# Patient Record
Sex: MALE | Race: OTHER | HISPANIC OR LATINO | URBAN - METROPOLITAN AREA
[De-identification: names, ages, dates, MRNs, and addresses within clinical notes are randomized per-mention and may not be internally consistent; named-entity substitution may affect disease eponyms.]

---

## 2017-01-06 ENCOUNTER — OUTPATIENT (OUTPATIENT)
Dept: OUTPATIENT SERVICES | Facility: HOSPITAL | Age: 32
LOS: 1 days | Discharge: HOME | End: 2017-01-06

## 2017-06-27 DIAGNOSIS — K02.53 DENTAL CARIES ON PIT AND FISSURE SURFACE PENETRATING INTO PULP: ICD-10-CM

## 2018-03-07 ENCOUNTER — OUTPATIENT (OUTPATIENT)
Dept: OUTPATIENT SERVICES | Facility: HOSPITAL | Age: 33
LOS: 1 days | Discharge: HOME | End: 2018-03-07

## 2018-03-12 DIAGNOSIS — K02.63 DENTAL CARIES ON SMOOTH SURFACE PENETRATING INTO PULP: ICD-10-CM

## 2019-10-04 ENCOUNTER — EMERGENCY (EMERGENCY)
Facility: HOSPITAL | Age: 34
LOS: 0 days | Discharge: HOME | End: 2019-10-04
Admitting: EMERGENCY MEDICINE
Payer: MEDICAID

## 2019-10-04 VITALS — WEIGHT: 175.05 LBS

## 2019-10-04 VITALS
DIASTOLIC BLOOD PRESSURE: 79 MMHG | RESPIRATION RATE: 18 BRPM | SYSTOLIC BLOOD PRESSURE: 137 MMHG | HEART RATE: 59 BPM | TEMPERATURE: 97 F | OXYGEN SATURATION: 99 %

## 2019-10-04 DIAGNOSIS — M79.676 PAIN IN UNSPECIFIED TOE(S): ICD-10-CM

## 2019-10-04 DIAGNOSIS — L60.0 INGROWING NAIL: ICD-10-CM

## 2019-10-04 PROCEDURE — 99283 EMERGENCY DEPT VISIT LOW MDM: CPT | Mod: 25

## 2019-10-04 PROCEDURE — 11730 AVULSION NAIL PLATE SIMPLE 1: CPT

## 2019-10-04 RX ORDER — CEPHALEXIN 500 MG
1 CAPSULE ORAL
Qty: 28 | Refills: 0
Start: 2019-10-04 | End: 2019-10-10

## 2019-10-04 NOTE — ED ADULT NURSE NOTE - OBJECTIVE STATEMENT
left great toe pain from cut ingrown toe nail. Toe is erythemic and edematous and has not gotten better, feels worse

## 2019-10-04 NOTE — ED PROVIDER NOTE - PATIENT PORTAL LINK FT
You can access the FollowMyHealth Patient Portal offered by [facility] by registering at the following website: [email or web address]. By joining [employer]’s FollowMyHealth portal, you will also be able to view your health information using other applications (apps) compatible with our system.

## 2019-10-04 NOTE — ED PROVIDER NOTE - PHYSICAL EXAMINATION
CONSTITUTIONAL: Well-appearing; well-nourished; in no apparent distress.   NECK: Supple; non-tender; no cervical lymphadenopathy.   CARDIOVASCULAR: Normal S1, S2; no murmurs, rubs, or gallops.   RESPIRATORY: Normal chest excursion with respiration; breath sounds clear and equal bilaterally; no wheezes, rhonchi, or rales.  MS: No evidence of trauma or deformity. Normal ROM in all four extremities; non-tender to palpation; distal pulses are normal.   SKIN: + ingrown toenail over medial aspect over L 1st toe  NEURO/PSYCH: A & O x 4; grossly unremarkable. mood and manner are appropriate

## 2019-10-04 NOTE — ED PROVIDER NOTE - NSFOLLOWUPCLINICS_GEN_ALL_ED_FT
[facility]  Podiatry  .  [place]   Phone: [phone]  Fax:   Follow Up Time:

## 2019-10-04 NOTE — ED PROVIDER NOTE - OBJECTIVE STATEMENT
33 year old m c/o ingrown toenail to L 1st toe x 3 weeks. No trauma/injuries, skin changes, decreased sensation , paresthesias, decreased rom, bruising

## 2019-10-04 NOTE — ED PROVIDER NOTE - NS ED ROS FT
Constitutional: no fever, chills, no recent weight loss, change in appetite or malaise  Cardiac: No chest pain, SOB or edema.  Respiratory: No cough or respiratory distress  GI: No nausea, vomiting, diarrhea or abdominal pain.  MS: no pain to back or extremities, no loss of ROM, no weakness  Neuro: No headache or weakness.   Skin: + ingrown toenail over L 1st toe  Except as documented in the HPI, all other systems are negative.

## 2019-10-04 NOTE — ED ADULT TRIAGE NOTE - CHIEF COMPLAINT QUOTE
Pt came c/o left big toe pain, swelling and redness x 3 weeks, pt stated he had an ingrown nail, pt cut it himself and it got infected after. As per wife, she had old antibiotics and she gave it to her  who has been taking it x 2 weeks but the nail is not getting better.

## 2022-07-06 ENCOUNTER — EMERGENCY (EMERGENCY)
Facility: HOSPITAL | Age: 37
LOS: 0 days | Discharge: HOME | End: 2022-07-06
Attending: EMERGENCY MEDICINE | Admitting: EMERGENCY MEDICINE

## 2022-07-06 VITALS
DIASTOLIC BLOOD PRESSURE: 100 MMHG | RESPIRATION RATE: 57 BRPM | WEIGHT: 179.9 LBS | OXYGEN SATURATION: 96 % | SYSTOLIC BLOOD PRESSURE: 164 MMHG | TEMPERATURE: 98 F | HEART RATE: 55 BPM

## 2022-07-06 DIAGNOSIS — N50.819 TESTICULAR PAIN, UNSPECIFIED: ICD-10-CM

## 2022-07-06 DIAGNOSIS — Z11.3 ENCOUNTER FOR SCREENING FOR INFECTIONS WITH A PREDOMINANTLY SEXUAL MODE OF TRANSMISSION: ICD-10-CM

## 2022-07-06 DIAGNOSIS — R10.9 UNSPECIFIED ABDOMINAL PAIN: ICD-10-CM

## 2022-07-06 DIAGNOSIS — N50.89 OTHER SPECIFIED DISORDERS OF THE MALE GENITAL ORGANS: ICD-10-CM

## 2022-07-06 LAB
ALBUMIN SERPL ELPH-MCNC: 5 G/DL — SIGNIFICANT CHANGE UP (ref 3.5–5.2)
ALP SERPL-CCNC: 102 U/L — SIGNIFICANT CHANGE UP (ref 30–115)
ALT FLD-CCNC: 61 U/L — HIGH (ref 0–41)
ANION GAP SERPL CALC-SCNC: 12 MMOL/L — SIGNIFICANT CHANGE UP (ref 7–14)
APPEARANCE UR: ABNORMAL
AST SERPL-CCNC: 40 U/L — SIGNIFICANT CHANGE UP (ref 0–41)
BACTERIA # UR AUTO: NEGATIVE — SIGNIFICANT CHANGE UP
BASOPHILS # BLD AUTO: 0.04 K/UL — SIGNIFICANT CHANGE UP (ref 0–0.2)
BASOPHILS NFR BLD AUTO: 0.5 % — SIGNIFICANT CHANGE UP (ref 0–1)
BILIRUB SERPL-MCNC: 0.6 MG/DL — SIGNIFICANT CHANGE UP (ref 0.2–1.2)
BILIRUB UR-MCNC: NEGATIVE — SIGNIFICANT CHANGE UP
BUN SERPL-MCNC: 16 MG/DL — SIGNIFICANT CHANGE UP (ref 10–20)
CALCIUM SERPL-MCNC: 9 MG/DL — SIGNIFICANT CHANGE UP (ref 8.5–10.1)
CHLORIDE SERPL-SCNC: 101 MMOL/L — SIGNIFICANT CHANGE UP (ref 98–110)
CO2 SERPL-SCNC: 27 MMOL/L — SIGNIFICANT CHANGE UP (ref 17–32)
COLOR SPEC: YELLOW — SIGNIFICANT CHANGE UP
CREAT SERPL-MCNC: 0.8 MG/DL — SIGNIFICANT CHANGE UP (ref 0.7–1.5)
DIFF PNL FLD: NEGATIVE — SIGNIFICANT CHANGE UP
EGFR: 119 ML/MIN/1.73M2 — SIGNIFICANT CHANGE UP
EOSINOPHIL # BLD AUTO: 0.22 K/UL — SIGNIFICANT CHANGE UP (ref 0–0.7)
EOSINOPHIL NFR BLD AUTO: 3 % — SIGNIFICANT CHANGE UP (ref 0–8)
EPI CELLS # UR: 0 /HPF — SIGNIFICANT CHANGE UP (ref 0–5)
GLUCOSE SERPL-MCNC: 94 MG/DL — SIGNIFICANT CHANGE UP (ref 70–99)
GLUCOSE UR QL: ABNORMAL
HCT VFR BLD CALC: 47 % — SIGNIFICANT CHANGE UP (ref 42–52)
HGB BLD-MCNC: 16.6 G/DL — SIGNIFICANT CHANGE UP (ref 14–18)
HIV 1 & 2 AB SERPL IA.RAPID: SIGNIFICANT CHANGE UP
HYALINE CASTS # UR AUTO: 1 /LPF — SIGNIFICANT CHANGE UP (ref 0–7)
IMM GRANULOCYTES NFR BLD AUTO: 0.5 % — HIGH (ref 0.1–0.3)
KETONES UR-MCNC: NEGATIVE — SIGNIFICANT CHANGE UP
LEUKOCYTE ESTERASE UR-ACNC: NEGATIVE — SIGNIFICANT CHANGE UP
LYMPHOCYTES # BLD AUTO: 2.23 K/UL — SIGNIFICANT CHANGE UP (ref 1.2–3.4)
LYMPHOCYTES # BLD AUTO: 30.6 % — SIGNIFICANT CHANGE UP (ref 20.5–51.1)
MCHC RBC-ENTMCNC: 31 PG — SIGNIFICANT CHANGE UP (ref 27–31)
MCHC RBC-ENTMCNC: 35.3 G/DL — SIGNIFICANT CHANGE UP (ref 32–37)
MCV RBC AUTO: 87.9 FL — SIGNIFICANT CHANGE UP (ref 80–94)
MONOCYTES # BLD AUTO: 0.56 K/UL — SIGNIFICANT CHANGE UP (ref 0.1–0.6)
MONOCYTES NFR BLD AUTO: 7.7 % — SIGNIFICANT CHANGE UP (ref 1.7–9.3)
NEUTROPHILS # BLD AUTO: 4.19 K/UL — SIGNIFICANT CHANGE UP (ref 1.4–6.5)
NEUTROPHILS NFR BLD AUTO: 57.7 % — SIGNIFICANT CHANGE UP (ref 42.2–75.2)
NITRITE UR-MCNC: NEGATIVE — SIGNIFICANT CHANGE UP
NRBC # BLD: 0 /100 WBCS — SIGNIFICANT CHANGE UP (ref 0–0)
PH UR: 7.5 — SIGNIFICANT CHANGE UP (ref 5–8)
PLATELET # BLD AUTO: 246 K/UL — SIGNIFICANT CHANGE UP (ref 130–400)
POTASSIUM SERPL-MCNC: 4.1 MMOL/L — SIGNIFICANT CHANGE UP (ref 3.5–5)
POTASSIUM SERPL-SCNC: 4.1 MMOL/L — SIGNIFICANT CHANGE UP (ref 3.5–5)
PROT SERPL-MCNC: 7.8 G/DL — SIGNIFICANT CHANGE UP (ref 6–8)
PROT UR-MCNC: SIGNIFICANT CHANGE UP
RBC # BLD: 5.35 M/UL — SIGNIFICANT CHANGE UP (ref 4.7–6.1)
RBC # FLD: 12.4 % — SIGNIFICANT CHANGE UP (ref 11.5–14.5)
RBC CASTS # UR COMP ASSIST: 5 /HPF — HIGH (ref 0–4)
SODIUM SERPL-SCNC: 140 MMOL/L — SIGNIFICANT CHANGE UP (ref 135–146)
SP GR SPEC: 1.03 — SIGNIFICANT CHANGE UP (ref 1.01–1.03)
UROBILINOGEN FLD QL: SIGNIFICANT CHANGE UP
WBC # BLD: 7.28 K/UL — SIGNIFICANT CHANGE UP (ref 4.8–10.8)
WBC # FLD AUTO: 7.28 K/UL — SIGNIFICANT CHANGE UP (ref 4.8–10.8)
WBC UR QL: 1 /HPF — SIGNIFICANT CHANGE UP (ref 0–5)

## 2022-07-06 PROCEDURE — 99285 EMERGENCY DEPT VISIT HI MDM: CPT

## 2022-07-06 PROCEDURE — 74177 CT ABD & PELVIS W/CONTRAST: CPT | Mod: 26,MA

## 2022-07-06 PROCEDURE — 76870 US EXAM SCROTUM: CPT | Mod: 26

## 2022-07-06 RX ORDER — CEFTRIAXONE 500 MG/1
500 INJECTION, POWDER, FOR SOLUTION INTRAMUSCULAR; INTRAVENOUS ONCE
Refills: 0 | Status: COMPLETED | OUTPATIENT
Start: 2022-07-06 | End: 2022-07-06

## 2022-07-06 RX ADMIN — CEFTRIAXONE 500 MILLIGRAM(S): 500 INJECTION, POWDER, FOR SOLUTION INTRAMUSCULAR; INTRAVENOUS at 16:43

## 2022-07-06 NOTE — ED PROVIDER NOTE - PHYSICAL EXAMINATION
CONSTITUTIONAL: Well-developed; well-nourished; in no acute distress.   SKIN: warm, dry  HEAD: Normocephalic; atraumatic.  EYES: PERRL, EOMI, normal sclera and conjunctiva   ENT: No nasal discharge; airway clear.  NECK: Supple; non tender.  CARD:  Regular rate and rhythm.   RESP: NO inc WOB   ABD: soft ntnd  EXT: Normal ROM.   URO: normal cremasteric reflex, no tenderness or erythema, no penile discharge.    LYMPH: No acute cervical adenopathy.  NEURO: Alert, oriented, grossly unremarkable  PSYCH: Cooperative, appropriate.

## 2022-07-06 NOTE — ED ADULT TRIAGE NOTE - CHIEF COMPLAINT QUOTE
pt c.o. flank pain associated with dysuria   pt states "on accident my penis touched the inside of a toilet bowl in a bar and I think I have an infection"

## 2022-07-06 NOTE — ED PROVIDER NOTE - PATIENT PORTAL LINK FT
You can access the FollowMyHealth Patient Portal offered by [facility] by registering at the following website: [email or web address]. By joining [employer]’s FollowMyHealth portal, you will also be able to view your health information using other applications (apps) compatible with our system.

## 2022-07-06 NOTE — ED ADULT NURSE NOTE - OBJECTIVE STATEMENT
Pt presented to ED c/o flank pain. Pt c/o b/lo flank pain x1 week. Pt also c/o dysuria after having penis contacted dirty public toilet. Denies n/v/d/fevers/chills. Pt A&ox4, ambulatory.

## 2022-07-06 NOTE — ED PROVIDER NOTE - CARE PLAN
Principal Discharge DX:	Urinary tract infection symptoms  Secondary Diagnosis:	Encounter for screening and preventative care   1

## 2022-07-06 NOTE — ED PROVIDER NOTE - NSFOLLOWUPINSTRUCTIONS_ED_ALL_ED_FT
PLEASE FOLLOW UP WITH YOUR UROLOGIST (NUMBER BELOW) FOR THE ULTRASOUND RESULTS    Sexually Transmitted Disease    A sexually transmitted disease (STD) is a disease or infection that may be passed (transmitted) from person to person, usually during sexual activity. This may happen by way of saliva, semen, blood, vaginal mucus, or urine. Symptoms vary depending on the type of STD acquired and may include pain in the groin, discharge, and lesions or a rash. If you are started on an antibiotic take it exactly as instructed. Avoid sexual contact of any kind until cleared by a health care professional. Contact your sexual partner(s) to inform them of your diagnosis so that they may be tested as well.    SEEK IMMEDIATE MEDICAL CARE IF YOU HAVE THE FOLLOWING SYMPTOMS: severe abdominal pain, high fever, nausea/vomiting, or weight loss.

## 2022-07-06 NOTE — ED PROVIDER NOTE - PROGRESS NOTE DETAILS
ALISIA: language line  [id number]  used. called pt to confirm that he followed up with urology given his US results and he stated that he had not yet. I reexplained the results and I urged him to follow up as soon as possible. he stated that he has a urologist that he can follow up with and he will call them. I offered referral to [facility] urologists in event he feels he cannot with his, but he felt comfortable following up with his urologist and declined the phone numbers.

## 2022-07-06 NOTE — ED PROVIDER NOTE - OBJECTIVE STATEMENT
36 year old male with no phmx comes in for flank and testicular pain. pt is worried abt julianne stds and wants to get tested and treated prophylactically. pt is sexually active with one female but has had pain in his testicles and b/l flank for a week after going to a concert/bar.

## 2022-07-06 NOTE — ED PROVIDER NOTE - CLINICAL SUMMARY MEDICAL DECISION MAKING FREE TEXT BOX
36-year-old male with no significant past medical history presents with left flank pain and testicular pain.  States that he is worried about STDs.  And wants to get tested.  Denies any fever.  Mild dysuria.  On exam nontoxic, vital signs stable, heart regular no murmurs, lungs clear bilaterally, no CVA tenderness, abdomen benign,  exam within normal limits per resident.  Work-up with overall reassuring labs and CT abdomen pelvis.  Ultrasound with no signs of epididymoorchitis or testicular torsion but does have microlithiasis and needs further outpatient work-up with urology to rule out malignancy.  Resident discussed this with the patient over  phone.  Empirically treated for GC and chlamydia.  Strict return precautions discussed. In my opinion, based on current evaluation and results, an acute medical or surgical emergency does not appear to be occurring at this time and I feel that the pt is stable for further outpatient work up and/or treatment.

## 2022-07-06 NOTE — ED ADULT NURSE NOTE - CHIEF COMPLAINT QUOTE
pt c.o. flank pain associated with dysuria   pt states "on accident my penis touched the inside of a toilet bowl in a bar and I think I have an infection"
Return to ED if problems recur

## 2022-07-07 LAB
C TRACH RRNA SPEC QL NAA+PROBE: SIGNIFICANT CHANGE UP
N GONORRHOEA RRNA SPEC QL NAA+PROBE: SIGNIFICANT CHANGE UP
SPECIMEN SOURCE: SIGNIFICANT CHANGE UP
SPECIMEN SOURCE: SIGNIFICANT CHANGE UP

## 2022-07-08 LAB
CULTURE RESULTS: NO GROWTH — SIGNIFICANT CHANGE UP
SPECIMEN SOURCE: SIGNIFICANT CHANGE UP

## 2024-04-24 ENCOUNTER — EMERGENCY (EMERGENCY)
Facility: HOSPITAL | Age: 39
LOS: 0 days | Discharge: HOME | End: 2024-04-24
Attending: EMERGENCY MEDICINE
Payer: MEDICAID

## 2024-04-24 VITALS
SYSTOLIC BLOOD PRESSURE: 146 MMHG | OXYGEN SATURATION: 99 % | HEIGHT: 68 IN | DIASTOLIC BLOOD PRESSURE: 83 MMHG | RESPIRATION RATE: 18 BRPM | WEIGHT: 179.9 LBS | HEART RATE: 63 BPM | TEMPERATURE: 98 F

## 2024-04-24 DIAGNOSIS — T15.91XA FOREIGN BODY ON EXTERNAL EYE, PART UNSPECIFIED, RIGHT EYE, INITIAL ENCOUNTER: ICD-10-CM

## 2024-04-24 DIAGNOSIS — H57.11 OCULAR PAIN, RIGHT EYE: ICD-10-CM

## 2024-04-24 DIAGNOSIS — H54.7 UNSPECIFIED VISUAL LOSS: ICD-10-CM
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
## 2024-04-24 PROCEDURE — 99283 EMERGENCY DEPT VISIT LOW MDM: CPT

## 2024-04-24 PROCEDURE — 99284 EMERGENCY DEPT VISIT MOD MDM: CPT

## 2024-04-24 RX ORDER — OFLOXACIN 0.3 %
2 DROPS OPHTHALMIC (EYE) ONCE
Refills: 0 | Status: COMPLETED | OUTPATIENT
Start: 2024-04-24 | End: 2024-04-24

## 2024-04-24 RX ADMIN — Medication 2 DROP(S): at 18:23

## 2024-04-24 NOTE — ED PROVIDER NOTE - CLINICAL SUMMARY MEDICAL DECISION MAKING FREE TEXT BOX
38-year-old male history of baseline left eye vision loss presenting for evaluation of right eye foreign body sensation.  States that he was leaf blowing outside, felt something go into his right eye.  Associated pain, tearing.  No other vision changes.  No other injuries or acute complaints.  Well appearing, NAD, non toxic. NCAT right conjunctival injection, foreign body to 6:00 region, no abrasion, macario negative, EOMI neck supple non tender normal wob.  Status post foreign body removal, small amount left.  Recommend up for follow-up.  Repeat ocular examination status post foreign body removal Macario negative.  Minimal fluorescein uptake.  Comfortable with discharge and follow-up outpatient, strict return precautions given. Endorses understanding of all of this and aware that they can return at any time for new or concerning symptoms. No further questions or concerns at this time

## 2024-04-24 NOTE — ED PROVIDER NOTE - NSFOLLOWUPINSTRUCTIONS_ED_ALL_ED_FT
Use eye drops by placing 2 drops ofloxacin in right eye every 6 hours (4 times a day) for 5 days.    Follow-up in ophthalmology clinic TOMORROW.    Eye Foreign Body  An eye foreign body is an object on the surface of the eye or in the eyeball that should not be there. The foreign body may be a small speck of dirt or dust, a hair or eyelash, a splinter, a tiny piece of metal, or any other object.    A foreign body can injure the eye. It may be found on the outside of the eyeball (extraocular) or inside the eyeball (intraocular). An intraocular foreign body is a medical emergency because it can result in vision loss or loss of the eye.    What are the causes?  This condition is caused by an object accidentally hitting or entering the eye. A common cause is when a tiny piece of metal is thrown into the air while a person is working with certain tools.    What are the signs or symptoms?  Symptoms of this condition depend on what the foreign body is, and where it is in the eye. In some cases, a small foreign body may cause few symptoms at first. Foreign bodies are commonly found:  On the inner surface of the eyelids or on the covering of the white part of the eye (conjunctiva). A foreign body here may cause:  Pain and irritation, especially when blinking.  Feeling like something is in the eye.  Tearing.  Redness.  On the surface of the clear covering on the front of the eye (cornea). A foreign body here may cause:  Pain and irritation.  Small "rust rings" around a metal (metallic) foreign body.  Feeling like something is in the eye.  Blurry vision.  Tearing.  Redness.  Inside the eyeball. A foreign body here may cause:  A lot of pain.  Immediate loss of vision or blurry vision.  A change in the shape of the black part of the eye (distortion of the pupil).  How is this diagnosed?  Foreign bodies are found during an exam by an eye care specialist.  Foreign bodies on the eyelids, conjunctiva, or cornea are usually (but not always) easily found.  When a foreign body is inside the eyeball, cloudiness in the lens of the eye (cataract) may form almost right away. This makes it hard for an eye specialist to find the foreign body. You may need tests, such as:  Ultrasound.  X-rays.  CT scan.  How is this treated?  Foreign bodies on the eyelids, conjunctiva, or cornea are often removed easily and painlessly. Your health care provider may do this by washing the eye or using small instruments to take the foreign body out. Treatment may also include:  Using numbing (anesthetic) eye drops to relieve pain.  Removal of rust in the cornea using a small, drill-like instrument.  Antibiotic drops or ointment if the foreign body caused a scratch or scrape (abrasion) of the cornea.  Wearing a bandage (eye shield) over your eye.  If you have a foreign body inside your eyeball, you will need surgery right away.    You may need to be referred to an eye specialist (ophthalmologist) for further treatment.    Follow these instructions at home:    Medicines    Take over-the-counter and prescription medicines only as told by your health care provider. Use eye drops or ointment as directed.  If you were prescribed antibiotic drops or ointment, use it as told by your health care provider. Do not stop using the antibiotic even if your condition improves.  General instructions    If you have an eye shield:  Wear it as directed. Follow instructions from your health care provider about when to remove the shield.  Do not drive or use machinery while wearing the shield.  If you do not have an eye shield:  Keep your eye closed as much as possible.  Do not rub your eye.  Do not wear contact lenses until your eye feels normal again, or as instructed by your health care provider.  Wear dark sunglasses as needed to protect your eyes from bright light.  If you are doing activities with a high risk of eye injury, such as working with high-speed tools, wear protective eye covering.  Keep all follow-up visits. This is important.  Contact a health care provider if:  You have more pain in your eye.  You have problems with your eye shield.  You have abnormal fluid (discharge) coming from your eye.  Get help right away if:  Your vision gets worse.  You have more redness and swelling in or around your eye.  Summary  An eye foreign body is an object on the surface of the eye or in the eyeball that should not be there.  A foreign body can injure the eye. It may be found on the outside of the eyeball (extraocular) or inside the eyeball (intraocular). An intraocular foreign body is a medical emergency.  This condition is caused by objects that accidentally contact or enter the eye. Examples of these objects include dirt, dust, glass, metal, or an eyelash.  Treatment may involve removal of the foreign body by washing the eye, using certain instruments, or surgery. You may need to use antibiotics or wear a bandage (eye shield) over your eye.

## 2024-04-24 NOTE — ED PROVIDER NOTE - PHYSICAL EXAMINATION
Vital Signs: I have reviewed the initial vital signs.  Constitutional: appears stated age, no acute distress  EYES: No eyelid swelling, erythema, or warmth to palpation. No periorbital ecchymosis; soft orbits. Conjunctiva pink. PERRL, EOMI. No nystagmus. Sclera erythematous on right. No subconjunctival hemorrhage.  No eye chemosis, hydroleum, chalazion, no purulent discharge. Fluorescein test: No Jolly sign. No abrasion. + right eye foreign body 6 o'clock  Cardiovascular: S1 and S2, regular rate, regular rhythm, well-perfused extremities, radial pulses equal and 2+  Respiratory: unlabored respiratory effort  Gastrointestinal:  abdomen soft, non-tender  Musculoskeletal: supple neck  Integumentary: warm, dry, no rash  Neurologic: awake, alert, oriented x3, extremities’ motor and sensory functions grossly intact

## 2024-04-24 NOTE — ED PROVIDER NOTE - OBJECTIVE STATEMENT
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 Reports he was blowing leaves outside when he felt something in his right eye. Reports seeing a speck in the right eye. Endorses discomfort with right eye movements. Reports he is up to date with tetanus. Denies vision change, fall/trauma, headache, discharge from eye.

## 2024-04-24 NOTE — ED ADULT NURSE NOTE - CAS TRG GEN SKIN COLOR
Please push fluids. Make sure he is staying hydrated. Tylenol or Motrin for pain or fever.   Any worsening symptoms please go to the emergency department
Normal for race

## 2024-04-24 NOTE — ED PROVIDER NOTE - NSFOLLOWUPCLINICS_GEN_ALL_ED_FT
[facility]  Ophthalmolgy  [address], [address]  [place] [address]  Phone: [phone]  Fax:   Scheduled Appointment: 4/25/2024 9:30 AM

## 2024-04-24 NOTE — ED PROVIDER NOTE - PATIENT PORTAL LINK FT
You can access the FollowMyHealth Patient Portal offered by [facility] by registering at the following website: [email or web address]. By joining [employer]’s FollowMyHealth portal, you will also be able to view your health information using other applications (apps) compatible with our system.